# Patient Record
Sex: MALE | Race: WHITE | NOT HISPANIC OR LATINO | Employment: FULL TIME
[De-identification: names, ages, dates, MRNs, and addresses within clinical notes are randomized per-mention and may not be internally consistent; named-entity substitution may affect disease eponyms.]

---

## 2024-03-10 ENCOUNTER — HEALTH MAINTENANCE LETTER (OUTPATIENT)
Age: 49
End: 2024-03-10

## 2024-04-16 ENCOUNTER — OFFICE VISIT (OUTPATIENT)
Dept: FAMILY MEDICINE | Facility: CLINIC | Age: 49
End: 2024-04-16
Payer: COMMERCIAL

## 2024-04-16 VITALS
BODY MASS INDEX: 35.43 KG/M2 | WEIGHT: 233.8 LBS | RESPIRATION RATE: 16 BRPM | TEMPERATURE: 98.7 F | OXYGEN SATURATION: 96 % | HEART RATE: 77 BPM | SYSTOLIC BLOOD PRESSURE: 123 MMHG | DIASTOLIC BLOOD PRESSURE: 83 MMHG | HEIGHT: 68 IN

## 2024-04-16 DIAGNOSIS — Z12.11 SCREEN FOR COLON CANCER: ICD-10-CM

## 2024-04-16 DIAGNOSIS — Z13.220 LIPID SCREENING: ICD-10-CM

## 2024-04-16 DIAGNOSIS — E66.812 CLASS 2 SEVERE OBESITY DUE TO EXCESS CALORIES WITH SERIOUS COMORBIDITY AND BODY MASS INDEX (BMI) OF 35.0 TO 35.9 IN ADULT (H): ICD-10-CM

## 2024-04-16 DIAGNOSIS — I10 ESSENTIAL HYPERTENSION: Primary | ICD-10-CM

## 2024-04-16 DIAGNOSIS — K21.9 GASTROESOPHAGEAL REFLUX DISEASE, UNSPECIFIED WHETHER ESOPHAGITIS PRESENT: ICD-10-CM

## 2024-04-16 DIAGNOSIS — E66.01 CLASS 2 SEVERE OBESITY DUE TO EXCESS CALORIES WITH SERIOUS COMORBIDITY AND BODY MASS INDEX (BMI) OF 35.0 TO 35.9 IN ADULT (H): ICD-10-CM

## 2024-04-16 PROCEDURE — 90715 TDAP VACCINE 7 YRS/> IM: CPT | Performed by: FAMILY MEDICINE

## 2024-04-16 PROCEDURE — 99204 OFFICE O/P NEW MOD 45 MIN: CPT | Mod: 25 | Performed by: FAMILY MEDICINE

## 2024-04-16 PROCEDURE — 90471 IMMUNIZATION ADMIN: CPT | Performed by: FAMILY MEDICINE

## 2024-04-16 RX ORDER — LISINOPRIL/HYDROCHLOROTHIAZIDE 10-12.5 MG
1 TABLET ORAL DAILY
COMMUNITY
Start: 2023-05-29 | End: 2024-04-16

## 2024-04-16 RX ORDER — FLUTICASONE PROPIONATE 50 MCG
1 SPRAY, SUSPENSION (ML) NASAL
COMMUNITY

## 2024-04-16 RX ORDER — LISINOPRIL/HYDROCHLOROTHIAZIDE 10-12.5 MG
1 TABLET ORAL DAILY
Qty: 90 TABLET | Refills: 3 | Status: SHIPPED | OUTPATIENT
Start: 2024-04-16

## 2024-04-16 RX ORDER — LORATADINE 10 MG/1
TABLET ORAL
COMMUNITY

## 2024-04-16 NOTE — PROGRESS NOTES
"  Assessment & Plan     Essential hypertension  Blood pressure is well-controlled on current medication.  Patient will return fasting for labs as below.  - lisinopril-hydrochlorothiazide (ZESTORETIC) 10-12.5 MG tablet; Take 1 tablet by mouth daily  - Comprehensive metabolic panel (BMP + Alb, Alk Phos, ALT, AST, Total. Bili, TP); Future  - CBC with platelets; Future  - Lipid panel reflex to direct LDL Fasting; Future    Gastroesophageal reflux disease, unspecified whether esophagitis present  Symptoms are well-controlled on omeprazole.  Discussed the importance of healthy diet, exercise and weight loss in control of GERD.  - omeprazole (PRILOSEC) 20 MG DR capsule; Take 1 capsule (20 mg) by mouth daily  - Comprehensive metabolic panel (BMP + Alb, Alk Phos, ALT, AST, Total. Bili, TP); Future    Class 2 severe obesity due to excess calories with serious comorbidity and body mass index (BMI) of 35.0 to 35.9 in adult (H)  Discussed a vegetable-rich diet and regular cardiovascular exercise.  Comorbid conditions are GERD and hypertension.  - Lipid panel reflex to direct LDL Fasting; Future    Lipid screening  Patient will return fasting for lipid profile.  - Lipid panel reflex to direct LDL Fasting; Future    Screen for colon cancer  After a discussion of options, patient wishes to try Cologuard for colon cancer screening.  Order placed.            BMI  Estimated body mass index is 35.43 kg/m  as calculated from the following:    Height as of this encounter: 1.73 m (5' 8.11\").    Weight as of this encounter: 106.1 kg (233 lb 12.8 oz).   Weight management plan: Discussed healthy diet and exercise guidelines          Katherine Grace is a 49 year old, presenting for the following health issues:  Establish Care and Blood Pressure Check        4/16/2024     5:21 PM   Additional Questions   Roomed by Anabell KUO LPN     History of Present Illness       Reason for visit:  Check up    He eats 0-1 servings of fruits and vegetables " "daily.He consumes 1 sweetened beverage(s) daily.He exercises with enough effort to increase his heart rate 10 to 19 minutes per day.  He exercises with enough effort to increase his heart rate 4 days per week.   He is taking medications regularly.     Patient presents today to establish care.  He also requires refills of his current medications.  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  He has 2 teenage children.  He does some hockey coaching, and has trying to work in more activity.  He has been feeling well and has no particular questions or concerns today.  He takes his medications regularly and has no problems with his current medications.  His omeprazole works well when he takes this daily.      Review of Systems  Constitutional, HEENT, cardiovascular, pulmonary, gi and gu systems are negative, except as otherwise noted.      Objective    /83   Pulse 77   Temp 98.7  F (37.1  C) (Oral)   Resp 16   Ht 1.73 m (5' 8.11\")   Wt 106.1 kg (233 lb 12.8 oz)   SpO2 96%   BMI 35.43 kg/m    Body mass index is 35.43 kg/m .  Physical Exam   GENERAL: alert and no distress  NECK: no adenopathy, no asymmetry, masses, or scars  RESP: lungs clear to auscultation - no rales, rhonchi or wheezes  CV: regular rate and rhythm, normal S1 S2, no S3 or S4, no murmur, click or rub, no peripheral edema  ABDOMEN: soft, nontender, no hepatosplenomegaly, no masses and bowel sounds normal  MS: no gross musculoskeletal defects noted, no edema  NEURO: Normal strength and tone, mentation intact and speech normal  PSYCH: mentation appears normal, affect normal/bright    Diagnostics: Patient to return for fasting labs        Signed Electronically by: Jada Thakur MD    "

## 2024-04-21 ENCOUNTER — ORDERS ONLY (AUTO-RELEASED) (OUTPATIENT)
Dept: FAMILY MEDICINE | Facility: CLINIC | Age: 49
End: 2024-04-21
Payer: COMMERCIAL

## 2024-04-21 DIAGNOSIS — Z12.11 SCREEN FOR COLON CANCER: ICD-10-CM

## 2024-06-26 ENCOUNTER — OFFICE VISIT (OUTPATIENT)
Dept: FAMILY MEDICINE | Facility: CLINIC | Age: 49
End: 2024-06-26
Payer: COMMERCIAL

## 2024-06-26 VITALS
HEIGHT: 68 IN | WEIGHT: 230.4 LBS | HEART RATE: 72 BPM | SYSTOLIC BLOOD PRESSURE: 125 MMHG | DIASTOLIC BLOOD PRESSURE: 91 MMHG | RESPIRATION RATE: 16 BRPM | OXYGEN SATURATION: 95 % | BODY MASS INDEX: 34.92 KG/M2

## 2024-06-26 DIAGNOSIS — I10 ESSENTIAL HYPERTENSION: ICD-10-CM

## 2024-06-26 DIAGNOSIS — S46.011A TRAUMATIC COMPLETE TEAR OF RIGHT ROTATOR CUFF, INITIAL ENCOUNTER: ICD-10-CM

## 2024-06-26 DIAGNOSIS — E66.01 CLASS 2 SEVERE OBESITY DUE TO EXCESS CALORIES WITH SERIOUS COMORBIDITY AND BODY MASS INDEX (BMI) OF 35.0 TO 35.9 IN ADULT (H): ICD-10-CM

## 2024-06-26 DIAGNOSIS — Z12.11 SCREENING FOR COLON CANCER: ICD-10-CM

## 2024-06-26 DIAGNOSIS — Z13.220 LIPID SCREENING: ICD-10-CM

## 2024-06-26 DIAGNOSIS — Z01.818 PREOP GENERAL PHYSICAL EXAM: Primary | ICD-10-CM

## 2024-06-26 DIAGNOSIS — E66.812 CLASS 2 SEVERE OBESITY DUE TO EXCESS CALORIES WITH SERIOUS COMORBIDITY AND BODY MASS INDEX (BMI) OF 35.0 TO 35.9 IN ADULT (H): ICD-10-CM

## 2024-06-26 DIAGNOSIS — K21.9 GASTROESOPHAGEAL REFLUX DISEASE, UNSPECIFIED WHETHER ESOPHAGITIS PRESENT: ICD-10-CM

## 2024-06-26 LAB
ALBUMIN SERPL BCG-MCNC: 4.5 G/DL (ref 3.5–5.2)
ALP SERPL-CCNC: 74 U/L (ref 40–150)
ALT SERPL W P-5'-P-CCNC: 54 U/L (ref 0–70)
ANION GAP SERPL CALCULATED.3IONS-SCNC: 11 MMOL/L (ref 7–15)
AST SERPL W P-5'-P-CCNC: 45 U/L (ref 0–45)
BILIRUB SERPL-MCNC: 0.6 MG/DL
BUN SERPL-MCNC: 12.5 MG/DL (ref 6–20)
CALCIUM SERPL-MCNC: 9.7 MG/DL (ref 8.6–10)
CHLORIDE SERPL-SCNC: 99 MMOL/L (ref 98–107)
CHOLEST SERPL-MCNC: 198 MG/DL
CREAT SERPL-MCNC: 1.04 MG/DL (ref 0.67–1.17)
DEPRECATED HCO3 PLAS-SCNC: 27 MMOL/L (ref 22–29)
EGFRCR SERPLBLD CKD-EPI 2021: 89 ML/MIN/1.73M2
ERYTHROCYTE [DISTWIDTH] IN BLOOD BY AUTOMATED COUNT: 13.8 % (ref 10–15)
FASTING STATUS PATIENT QL REPORTED: YES
FASTING STATUS PATIENT QL REPORTED: YES
GLUCOSE SERPL-MCNC: 112 MG/DL (ref 70–99)
HCT VFR BLD AUTO: 51.7 % (ref 40–53)
HDLC SERPL-MCNC: 38 MG/DL
HGB BLD-MCNC: 17.3 G/DL (ref 13.3–17.7)
LDLC SERPL CALC-MCNC: 119 MG/DL
MCH RBC QN AUTO: 29.8 PG (ref 26.5–33)
MCHC RBC AUTO-ENTMCNC: 33.5 G/DL (ref 31.5–36.5)
MCV RBC AUTO: 89 FL (ref 78–100)
NONHDLC SERPL-MCNC: 160 MG/DL
PLATELET # BLD AUTO: 208 10E3/UL (ref 150–450)
POTASSIUM SERPL-SCNC: 4.5 MMOL/L (ref 3.4–5.3)
PROT SERPL-MCNC: 7.6 G/DL (ref 6.4–8.3)
RBC # BLD AUTO: 5.81 10E6/UL (ref 4.4–5.9)
SODIUM SERPL-SCNC: 137 MMOL/L (ref 135–145)
TRIGL SERPL-MCNC: 205 MG/DL
WBC # BLD AUTO: 8 10E3/UL (ref 4–11)

## 2024-06-26 PROCEDURE — G2211 COMPLEX E/M VISIT ADD ON: HCPCS | Performed by: PHYSICIAN ASSISTANT

## 2024-06-26 PROCEDURE — 80061 LIPID PANEL: CPT | Performed by: PHYSICIAN ASSISTANT

## 2024-06-26 PROCEDURE — 80053 COMPREHEN METABOLIC PANEL: CPT | Performed by: PHYSICIAN ASSISTANT

## 2024-06-26 PROCEDURE — 85027 COMPLETE CBC AUTOMATED: CPT | Performed by: PHYSICIAN ASSISTANT

## 2024-06-26 PROCEDURE — 99214 OFFICE O/P EST MOD 30 MIN: CPT | Performed by: PHYSICIAN ASSISTANT

## 2024-06-26 PROCEDURE — 36415 COLL VENOUS BLD VENIPUNCTURE: CPT | Performed by: PHYSICIAN ASSISTANT

## 2024-06-26 NOTE — PROGRESS NOTES
Preoperative Evaluation  [facility]  [address] [facility] [address]  Phone: [phone]  Fax: [phone]  Primary Provider: Keshav Aguilera PA-C  Pre-op Performing Provider: Keshav Aguilera PA-C  Jun 26, 2024 6/25/2024   Surgical Information   What procedure is being done? Rotator cuff surgery   Facility or Hospital where procedure/surgery will be performed: [facility]   Who is doing the procedure / surgery? Robbin Knox D.O.   Date of surgery / procedure: 7-15-24   Time of surgery / procedure: ?   Where do you plan to recover after surgery? at home with family        Fax number for surgical facility: [phone]    Assessment & Plan     The proposed surgical procedure is considered INTERMEDIATE risk.    Preop general physical exam  Stable preop.    Traumatic complete tear of right rotator cuff, initial encounter      Screening for colon cancer    - Colonoscopy Screening  Referral; Future      Blood pressure higher than ideal but in acceptable range for preop.  Will recheck blood pressure in approximately 1 month via phone and if persistently elevated consider adjustment of his medications.          - No identified additional risk factors other than previously addressed    Preoperative Medication Instructions  Antiplatelet or Anticoagulation Medication Instructions   - Patient is on no antiplatelet or anticoagulation medications.    Additional Medication Instructions  Take all scheduled medications on the day of surgery EXCEPT for modifications listed below:  Hold aspirin, ibuprofen, aleve 1 week prior to surgery    - ACE/ARB: DO NOT TAKE on day of surgery (minimum 11 hours for general anesthesia).   - Diuretics: DO NOT TAKE on the day of surgery.    Recommendation  Approval given to proceed with proposed procedure, without further diagnostic evaluation.    Katherine Grace is a 49 year old, presenting for the  following:  Pre-Op Exam (DOS 07/15/2024, @ [facility] in [place], Right shoulder, with Dr. Addison)          6/26/2024     9:05 AM   Additional Questions   Roomed by MARICEL Mondragon related to upcoming procedure: History of traumatic complete tear of the right rotator cuff.  The above procedure was deemed the next best step in management.        6/25/2024   Pre-Op Questionnaire   Have you ever had a heart attack or stroke? No   Have you ever had surgery on your heart or blood vessels, such as a stent placement, a coronary artery bypass, or surgery on an artery in your head, neck, heart, or legs? No   Do you have chest pain with activity? No   Do you have a history of heart failure? No   Do you currently have a cold, bronchitis or symptoms of other infection? No   Do you have a cough, shortness of breath, or wheezing? No   Do you or anyone in your family have previous history of blood clots? No   Do you or does anyone in your family have a serious bleeding problem such as prolonged bleeding following surgeries or cuts? No   Have you ever had problems with anemia or been told to take iron pills? No   Have you had any abnormal blood loss such as black, tarry or bloody stools? No   Have you ever had a blood transfusion? No   Are you willing to have a blood transfusion if it is medically needed before, during, or after your surgery? Yes   Have you or any of your relatives ever had problems with anesthesia? No   Do you have sleep apnea, excessive snoring or daytime drowsiness? (!) YES   Do you have a CPAP machine? (!) NO    Do you have any artifical heart valves or other implanted medical devices like a pacemaker, defibrillator, or continuous glucose monitor? No   Do you have artificial joints? No   Are you allergic to latex? No        Health Care Directive  Patient does not have a Health Care Directive or Living Will: Discussed advance care planning with patient; information given to patient to  review.    Preoperative Review of    reviewed - no record of controlled substances prescribed.      Status of Chronic Conditions:  See problem list for active medical problems.  Problems all longstanding and stable, except as noted/documented.  See ROS for pertinent symptoms related to these conditions.    HYPERTENSION - Patient has longstanding history of HTN , currently denies any symptoms referable to elevated blood pressure. Specifically denies chest pain, palpitations, dyspnea, orthopnea, PND or peripheral edema. Blood pressure readings have been in normal range. Current medication regimen is as listed below. Patient denies any side effects of medication.     Patient Active Problem List    Diagnosis Date Noted    Class 2 severe obesity due to excess calories with serious comorbidity in adult (H) 04/16/2024     Priority: Medium    Essential hypertension 11/09/2022     Priority: Medium    GERD (gastroesophageal reflux disease) 01/30/2009     Priority: Medium      History reviewed. No pertinent past medical history.  History reviewed. No pertinent surgical history.  Current Outpatient Medications   Medication Sig Dispense Refill    fluticasone (FLONASE) 50 MCG/ACT nasal spray Spray 1 spray in nostril      lisinopril-hydrochlorothiazide (ZESTORETIC) 10-12.5 MG tablet Take 1 tablet by mouth daily 90 tablet 3    loratadine (CLARITIN) 10 MG tablet Take  by mouth.      omeprazole (PRILOSEC) 20 MG DR capsule Take 1 capsule (20 mg) by mouth daily 90 capsule 3       No Known Allergies     Social History     Tobacco Use    Smoking status: Never    Smokeless tobacco: Current     Types: Chew    Tobacco comments:     1 can every 3 days -- maybe 7-8 times per day   Substance Use Topics    Alcohol use: Yes     Comment: 7/wk     Family History   Problem Relation Age of Onset    Diabetes Father     Cancer Father         testicular and lymphoma    Coronary Artery Disease Paternal Grandfather     Prostate Cancer No family hx  "of     Colon Cancer No family hx of      History   Drug Use Unknown             Review of Systems  Constitutional, neuro, ENT, endocrine, pulmonary, cardiac, gastrointestinal, genitourinary, musculoskeletal, integument and psychiatric systems are negative, except as otherwise noted.    Objective    BP (!) 125/91   Pulse 72   Resp 16   Ht 1.727 m (5' 8\")   Wt 104.5 kg (230 lb 6.4 oz)   SpO2 95%   BMI 35.03 kg/m     Estimated body mass index is 35.03 kg/m  as calculated from the following:    Height as of this encounter: 1.727 m (5' 8\").    Weight as of this encounter: 104.5 kg (230 lb 6.4 oz).  Physical Exam  GENERAL: alert and no distress  EYES: Eyes grossly normal to inspection, PERRL and conjunctivae and sclerae normal  HENT: ear canals and TM's normal, nose and mouth without ulcers or lesions  NECK: no adenopathy, no asymmetry, masses, or scars  RESP: lungs clear to auscultation - no rales, rhonchi or wheezes  CV: regular rate and rhythm, normal S1 S2, no S3 or S4, no murmur, click or rub, no peripheral edema  ABDOMEN: soft, nontender, no hepatosplenomegaly, no masses and bowel sounds normal  MS: no gross musculoskeletal defects noted, no edema  SKIN: no suspicious lesions or rashes  NEURO: Normal strength and tone, mentation intact and speech normal  PSYCH: mentation appears normal, affect normal/bright    No results for input(s): \"HGB\", \"PLT\", \"INR\", \"NA\", \"POTASSIUM\", \"CR\", \"A1C\" in the last 8760 hours.     Diagnostics  Labs pending at this time.  Results will be reviewed when available.  Last Comprehensive Metabolic Panel:  Sodium   Date Value Ref Range Status   06/26/2024 137 135 - 145 mmol/L Final     Potassium   Date Value Ref Range Status   06/26/2024 4.5 3.4 - 5.3 mmol/L Final     Chloride   Date Value Ref Range Status   06/26/2024 99 98 - 107 mmol/L Final     Carbon Dioxide (CO2)   Date Value Ref Range Status   06/26/2024 27 22 - 29 mmol/L Final     Anion Gap   Date Value Ref Range Status " "  06/26/2024 11 7 - 15 mmol/L Final     Glucose   Date Value Ref Range Status   06/26/2024 112 (H) 70 - 99 mg/dL Final     Urea Nitrogen   Date Value Ref Range Status   06/26/2024 12.5 6.0 - 20.0 mg/dL Final     Creatinine   Date Value Ref Range Status   06/26/2024 1.04 0.67 - 1.17 mg/dL Final     GFR Estimate   Date Value Ref Range Status   06/26/2024 89 >60 mL/min/1.73m2 Final     Comment:     eGFR calculated using 2021 CKD-EPI equation.     Calcium   Date Value Ref Range Status   06/26/2024 9.7 8.6 - 10.0 mg/dL Final     Bilirubin Total   Date Value Ref Range Status   06/26/2024 0.6 <=1.2 mg/dL Final     Alkaline Phosphatase   Date Value Ref Range Status   06/26/2024 74 40 - 150 U/L Final     ALT   Date Value Ref Range Status   06/26/2024 54 0 - 70 U/L Final     Comment:     Reference intervals for this test were updated on 6/12/2023 to more accurately reflect our healthy population. There may be differences in the flagging of prior results with similar values performed with this method. Interpretation of those prior results can be made in the context of the updated reference intervals.       AST   Date Value Ref Range Status   06/26/2024 45 0 - 45 U/L Final     Comment:     Reference intervals for this test were updated on 6/12/2023 to more accurately reflect our healthy population. There may be differences in the flagging of prior results with similar values performed with this method. Interpretation of those prior results can be made in the context of the updated reference intervals.               Lab Results   Component Value Date    WBC 8.0 06/26/2024     Lab Results   Component Value Date    RBC 5.81 06/26/2024     Lab Results   Component Value Date    HGB 17.3 06/26/2024     Lab Results   Component Value Date    HCT 51.7 06/26/2024     No components found for: \"MCT\"  Lab Results   Component Value Date    MCV 89 06/26/2024     Lab Results   Component Value Date    MCH 29.8 06/26/2024     Lab Results "   Component Value Date    MCHC 33.5 06/26/2024     Lab Results   Component Value Date    RDW 13.8 06/26/2024     Lab Results   Component Value Date     06/26/2024          No EKG required, no history of coronary heart disease, significant arrhythmia, peripheral arterial disease or other structural heart disease.    Revised Cardiac Risk Index (RCRI)  The patient has the following serious cardiovascular risks for perioperative complications:   - No serious cardiac risks = 0 points     RCRI Interpretation: 0 points: Class I (very low risk - 0.4% complication rate)         Signed Electronically by: Keshav Aguilera PA-C  Copy of this evaluation report is provided to requesting physician.

## 2024-06-26 NOTE — PATIENT INSTRUCTIONS

## 2024-07-26 ENCOUNTER — TELEPHONE (OUTPATIENT)
Dept: FAMILY MEDICINE | Facility: CLINIC | Age: 49
End: 2024-07-26

## 2024-07-26 NOTE — TELEPHONE ENCOUNTER
LMTCB, Please see message below from provider.      Keshav Aguilera PA-C [facility] Care Team Pool  Please call patient for BP recheck. Please update in patient reported vitals. If unable to check at home, please schedule nurse only bp check. If elevated, please inform me.    Thanks    John Paul aguilera, pac

## 2024-08-07 NOTE — TELEPHONE ENCOUNTER
Called and spoke with patient. He has not yet check BP readings at home but is able and willing to do so. Patient will call back or send vogogo message to update. Writer postponing message 2 weeks to allow patient response.    Maribell Colby RN

## 2024-08-21 ENCOUNTER — MYC MEDICAL ADVICE (OUTPATIENT)
Dept: FAMILY MEDICINE | Facility: CLINIC | Age: 49
End: 2024-08-21
Payer: COMMERCIAL

## 2024-11-27 ENCOUNTER — OFFICE VISIT (OUTPATIENT)
Dept: FAMILY MEDICINE | Facility: CLINIC | Age: 49
End: 2024-11-27
Payer: COMMERCIAL

## 2024-11-27 VITALS
OXYGEN SATURATION: 93 % | TEMPERATURE: 98.5 F | WEIGHT: 242.1 LBS | BODY MASS INDEX: 36.69 KG/M2 | SYSTOLIC BLOOD PRESSURE: 127 MMHG | HEIGHT: 68 IN | HEART RATE: 96 BPM | DIASTOLIC BLOOD PRESSURE: 85 MMHG | RESPIRATION RATE: 16 BRPM

## 2024-11-27 DIAGNOSIS — K11.20 PAROTID SIALOADENITIS: Primary | ICD-10-CM

## 2024-11-27 PROCEDURE — G2211 COMPLEX E/M VISIT ADD ON: HCPCS | Performed by: PHYSICIAN ASSISTANT

## 2024-11-27 PROCEDURE — 99213 OFFICE O/P EST LOW 20 MIN: CPT | Performed by: PHYSICIAN ASSISTANT

## 2024-11-27 RX ORDER — AMOXICILLIN 500 MG/1
500 TABLET, FILM COATED ORAL 3 TIMES DAILY
COMMUNITY

## 2024-11-27 ASSESSMENT — PAIN SCALES - GENERAL: PAINLEVEL_OUTOF10: MILD PAIN (3)

## 2024-11-27 NOTE — PATIENT INSTRUCTIONS
Continue parotid massage as discussed today    Continue to suck on hard candy    Take tylenol as needed for pain up to 1000mg three times daily, do not exceed 3000mg in 24 hour period    Take ibuprofen as needed for pain up to 600mg three times daily with food    Follow-up in one week if not better, be seen immediately if fever or worsening/spreading redness/swelling

## 2024-11-27 NOTE — PROGRESS NOTES
"Assessment and Plan:     (K11.20) Parotid sialoadenitis  (primary encounter diagnosis)  Comment: onset last couple days, right-sided swelling over parotid gland, was seen at [facility] and Rxed amox, massaging and sucking candy, here today because had pain last night and was hard to sleep, no fever/chills or purulence  Plan: continue massage and had candy  Can also apply warm compress   Use OTC analgesices, tylenol and ibuprofen  Discussed reasons to be seen promptly including fever/chills, spreading redness/swelling, airway compromise     [facility] Same Day Provider     Subjective   Sanjay is a 49 year old, presenting for the following health issues:  No chief complaint on file.    History of Present Illness       Reason for visit:  Swollen gland   He is taking medications regularly.         ED/UC Followup:    Facility:  [facility]  Date of visit: 11-  Reason for visit: Rt jaw swelling  Current Status  Pain continues    Sanjay is here for right-sided sialadenitis   Notice achy pain and swelling yetserday   He was seen at urgent care yesterday and was prescribed amox and started it last night  He continues to have pain  He has been massaging the area and has been using lemon cough drops  He denies fever/chills, he hasn't noticed any purulent discharge from the duct  He is not taking anything for pain currently         Objective      /85 (BP Location: Right arm, Patient Position: Sitting, Cuff Size: Adult Large)   Pulse 96   Temp 98.5  F (36.9  C) (Tympanic)   Resp 16   Ht 1.727 m (5' 8\")   Wt 109.8 kg (242 lb 1.6 oz)   SpO2 93%   BMI 36.81 kg/m      Physical Exam     EXAM:  GENERAL APPEARANCE: healthy, alert and no distress  EYES: Eyes grossly normal to inspection  HENT: ear canals and TMs normal and nose and mouth without ulcers or lesions, oropharynx is clear without exudate or erythema, no tonsillar swelling  Right cheek/anterior ear with swelling, no induration or " tenderness  NECK: supple no adenopathy, no asymmetry, masses  RESP: lungs clear to auscultation - no rales, rhonchi or wheezes  CV: regular rates and rhythm, normal S1 S2, no S3 or S4 and no murmur, click or rub  EXT: no edema bilateral lower extremities         Signed Electronically by: Gabby Massey PA-C

## 2025-03-16 ENCOUNTER — HEALTH MAINTENANCE LETTER (OUTPATIENT)
Age: 50
End: 2025-03-16

## 2025-05-17 DIAGNOSIS — I10 ESSENTIAL HYPERTENSION: ICD-10-CM

## 2025-05-17 DIAGNOSIS — K21.9 GASTROESOPHAGEAL REFLUX DISEASE, UNSPECIFIED WHETHER ESOPHAGITIS PRESENT: ICD-10-CM

## 2025-05-19 RX ORDER — OMEPRAZOLE 20 MG/1
20 CAPSULE, DELAYED RELEASE ORAL DAILY
Qty: 90 CAPSULE | Refills: 1 | Status: SHIPPED | OUTPATIENT
Start: 2025-05-19

## 2025-05-19 RX ORDER — LISINOPRIL AND HYDROCHLOROTHIAZIDE 10; 12.5 MG/1; MG/1
1 TABLET ORAL DAILY
Qty: 90 TABLET | Refills: 1 | Status: SHIPPED | OUTPATIENT
Start: 2025-05-19